# Patient Record
Sex: FEMALE | Race: WHITE | Employment: OTHER | URBAN - METROPOLITAN AREA
[De-identification: names, ages, dates, MRNs, and addresses within clinical notes are randomized per-mention and may not be internally consistent; named-entity substitution may affect disease eponyms.]

---

## 2019-08-05 ENCOUNTER — APPOINTMENT (OUTPATIENT)
Dept: CT IMAGING | Age: 69
End: 2019-08-05
Payer: MEDICARE

## 2019-08-05 ENCOUNTER — APPOINTMENT (OUTPATIENT)
Dept: GENERAL RADIOLOGY | Age: 69
End: 2019-08-05
Payer: MEDICARE

## 2019-08-05 ENCOUNTER — HOSPITAL ENCOUNTER (EMERGENCY)
Age: 69
Discharge: HOME | End: 2019-08-05
Payer: MEDICARE

## 2019-08-05 VITALS
DIASTOLIC BLOOD PRESSURE: 55 MMHG | RESPIRATION RATE: 16 BRPM | OXYGEN SATURATION: 97 % | HEART RATE: 55 BPM | TEMPERATURE: 98.1 F | SYSTOLIC BLOOD PRESSURE: 112 MMHG

## 2019-08-05 DIAGNOSIS — S00.83XA CONTUSION OF FOREHEAD, INITIAL ENCOUNTER: ICD-10-CM
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
## 2019-08-05 PROCEDURE — 99284 EMERGENCY DEPT VISIT MOD MDM: CPT

## 2019-08-05 PROCEDURE — 70486 CT MAXILLOFACIAL W/O DYE: CPT

## 2019-08-05 PROCEDURE — 72125 CT NECK SPINE W/O DYE: CPT

## 2019-08-05 PROCEDURE — 90715 TDAP VACCINE 7 YRS/> IM: CPT | Performed by: PHYSICIAN ASSISTANT

## 2019-08-05 PROCEDURE — 73030 X-RAY EXAM OF SHOULDER: CPT

## 2019-08-05 PROCEDURE — 90471 IMMUNIZATION ADMIN: CPT | Performed by: PHYSICIAN ASSISTANT

## 2019-08-05 PROCEDURE — 70450 CT HEAD/BRAIN W/O DYE: CPT

## 2019-08-05 PROCEDURE — 6360000002 HC RX W HCPCS: Performed by: PHYSICIAN ASSISTANT

## 2019-08-05 RX ORDER — DULOXETIN HYDROCHLORIDE 30 MG/1
30 CAPSULE, DELAYED RELEASE ORAL DAILY
COMMUNITY

## 2019-08-05 RX ORDER — BISACODYL 10 MG
10 SUPPOSITORY, RECTAL RECTAL
COMMUNITY

## 2019-08-05 RX ORDER — POLYETHYLENE GLYCOL 3350 17 G/17G
17 POWDER, FOR SOLUTION ORAL EVERY OTHER DAY
COMMUNITY

## 2019-08-05 RX ORDER — BARRIER CREAM 200; 4.5 MG/G; MG/G
CREAM TOPICAL 2 TIMES DAILY
COMMUNITY

## 2019-08-05 RX ORDER — FAMOTIDINE 20 MG/1
20 TABLET, FILM COATED ORAL DAILY
COMMUNITY

## 2019-08-05 RX ORDER — MAGNESIUM HYDROXIDE/ALUMINUM HYDROXICE/SIMETHICONE 120; 1200; 1200 MG/30ML; MG/30ML; MG/30ML
30 SUSPENSION ORAL EVERY 4 HOURS PRN
COMMUNITY

## 2019-08-05 RX ORDER — METOPROLOL SUCCINATE 25 MG/1
12.5 TABLET, EXTENDED RELEASE ORAL NIGHTLY
COMMUNITY

## 2019-08-05 RX ORDER — METOPROLOL SUCCINATE 25 MG/1
25 TABLET, EXTENDED RELEASE ORAL DAILY
COMMUNITY

## 2019-08-05 RX ORDER — ACETAMINOPHEN 325 MG/1
650 TABLET ORAL EVERY 6 HOURS PRN
COMMUNITY

## 2019-08-05 RX ORDER — AMLODIPINE BESYLATE 10 MG/1
10 TABLET ORAL DAILY
COMMUNITY

## 2019-08-05 RX ORDER — ATORVASTATIN CALCIUM 40 MG/1
40 TABLET, FILM COATED ORAL NIGHTLY
COMMUNITY

## 2019-08-05 RX ADMIN — TETANUS TOXOID, REDUCED DIPHTHERIA TOXOID AND ACELLULAR PERTUSSIS VACCINE, ADSORBED 0.5 ML: 5; 2.5; 8; 8; 2.5 SUSPENSION INTRAMUSCULAR at 13:45

## 2019-08-05 ASSESSMENT — PAIN SCALES - PAIN ASSESSMENT IN ADVANCED DEMENTIA (PAINAD)
BREATHING: 0
BODYLANGUAGE: 0
TOTALSCORE: 0
CONSOLABILITY: 0
NEGVOCALIZATION: 0
FACIALEXPRESSION: 0

## 2019-08-05 ASSESSMENT — ENCOUNTER SYMPTOMS
SHORTNESS OF BREATH: 0
ABDOMINAL PAIN: 0

## 2019-08-05 NOTE — ED PROVIDER NOTES
adjustment of the mA/kV was utilized to reduce the radiation dose to as low as reasonably achievable. COMPARISON: None HISTORY: ORDERING SYSTEM PROVIDED HISTORY: fall TECHNOLOGIST PROVIDED HISTORY: Reason for Exam: fall Acuity: Acute Type of Exam: Initial FINDINGS: FACIAL BONES:  The maxilla, pterygoid plates and zygomatic arches are intact. The mandible is intact. The mandibular condyles are normally situated. The nasal bones and maxillary nasal processes are intact. ORBITS:  The globes appear intact. The extraocular muscles, optic nerve sheath complexes and lacrimal glands appear unremarkable. No retrobulbar hematoma or mass is seen. The orbital walls and rims are intact. SINUSES/MASTOIDS:  The paranasal sinuses and mastoid air cells are well aerated. No acute fracture is seen. SOFT TISSUES:  No appreciable facial soft tissue swelling is seen. No acute fracture of the facial bones. Ct Cervical Spine Wo Contrast    Result Date: 8/5/2019  EXAMINATION: CT OF THE CERVICAL SPINE WITHOUT CONTRAST; CT OF THE HEAD WITHOUT CONTRAST, 8/5/2019 1:26 pm TECHNIQUE: CT of the cervical spine was performed without the administration of intravenous contrast. Multiplanar reformatted images are provided for review. Dose modulation, iterative reconstruction, and/or weight based adjustment of the mA/kV was utilized to reduce the radiation dose to as low as reasonably achievable.; CT of the head was performed without the administration of intravenous contrast. Dose modulation, iterative reconstruction, and/or weight based adjustment of the mA/kV was utilized to reduce the radiation dose to as low as reasonably achievable. COMPARISON: None HISTORY: ORDERING SYSTEM PROVIDED HISTORY: Fall TECHNOLOGIST PROVIDED HISTORY: Reason for Exam: Fall Acuity: Acute Type of Exam: Initial; ORDERING SYSTEM PROVIDED HISTORY: Fall TECHNOLOGIST PROVIDED HISTORY: Has a \"code stroke\" or \"stroke alert\" been called? ->No Reason for Exam: Fall

## 2019-08-05 NOTE — ED NOTES
Pharmacy Medication History Note      List of current medications patient is taking is complete. Source of information: MAR from [facility], called nurse for last dose times    Changes made to medication list:  Medications flagged for removal (include reason, ex. noncompliance):  N/A    Medications removed (include reason, ex. therapy complete or physician discontinued):  N/A    Medications added/doses adjusted: All medications    Other notes (ex. Recent course of antibiotics, Coumadin dosing):  Denies use of other OTC or herbal medications. Last dose times updated. Cyndi Kennedy [facility]    No current facility-administered medications on file prior to encounter.         Current Outpatient Medications on File Prior to Encounter   Medication Sig Dispense Refill    acetaminophen (TYLENOL) 325 MG tablet Take 650 mg by mouth every 6 hours as needed for Pain      amLODIPine (NORVASC) 10 MG tablet Take 10 mg by mouth daily      aluminum & magnesium hydroxide-simethicone (MAALOX) 200-200-20 MG/5ML SUSP suspension Take 30 mLs by mouth every 4 hours as needed for Indigestion      aspirin 81 MG tablet Take 81 mg by mouth daily      atorvastatin (LIPITOR) 40 MG tablet Take 40 mg by mouth nightly      bisacodyl (DULCOLAX) 10 MG suppository Place 10 mg rectally every 72 hours as needed for Constipation      Menthol-Zinc Oxide 0.45-20 % OINT Apply topically 2 times daily      Cranberry 450 MG TABS Take 1 tablet by mouth 2 times daily      DULoxetine (CYMBALTA) 30 MG extended release capsule Take 30 mg by mouth daily      famotidine (PEPCID) 20 MG tablet Take 20 mg by mouth daily      metoprolol succinate (TOPROL XL) 25 MG extended release tablet Take 25 mg by mouth daily      metoprolol succinate (TOPROL XL) 25 MG extended release tablet Take 12.5 mg by mouth nightly      magnesium hydroxide (MILK OF MAGNESIA) 400 MG/5ML suspension Take 30 mLs by mouth daily as needed for Constipation      polyethylene glycol

## 2019-08-05 NOTE — ED TRIAGE NOTES
Signed DNR-CC. Fall risk precautions in place, call light in reach, bed in lowest position, 2/2 bed rails up, bed wheels locked, bed side table within reach, bed alarm on, will continue to monitor.

## 2019-08-05 NOTE — ED NOTES
Bed: 07  Expected date:   Expected time:   Means of arrival: [facility]  Comments:  Fidencio Carcamo 73, [address] [facility] [place]  08/05/19 [id number]